# Patient Record
Sex: MALE | Race: WHITE | Employment: FULL TIME | URBAN - METROPOLITAN AREA
[De-identification: names, ages, dates, MRNs, and addresses within clinical notes are randomized per-mention and may not be internally consistent; named-entity substitution may affect disease eponyms.]

---

## 2018-01-12 ENCOUNTER — HOSPITAL ENCOUNTER (EMERGENCY)
Facility: HOSPITAL | Age: 28
Discharge: HOME | End: 2018-01-12
Attending: EMERGENCY MEDICINE
Payer: OTHER MISCELLANEOUS

## 2018-01-12 ENCOUNTER — APPOINTMENT (OUTPATIENT)
Dept: GENERAL RADIOLOGY | Facility: HOSPITAL | Age: 28
End: 2018-01-12
Attending: EMERGENCY MEDICINE
Payer: OTHER MISCELLANEOUS

## 2018-01-12 VITALS
WEIGHT: 135 LBS | RESPIRATION RATE: 16 BRPM | DIASTOLIC BLOOD PRESSURE: 74 MMHG | OXYGEN SATURATION: 100 % | SYSTOLIC BLOOD PRESSURE: 127 MMHG | HEART RATE: 61 BPM | HEIGHT: 66 IN | TEMPERATURE: 98 F | BODY MASS INDEX: 21.69 KG/M2

## 2018-01-12 DIAGNOSIS — S61.217A LACERATION OF LEFT LITTLE FINGER WITHOUT FOREIGN BODY WITHOUT DAMAGE TO NAIL, INITIAL ENCOUNTER: Primary | ICD-10-CM

## 2018-01-12 DIAGNOSIS — S61.219A FINGER LACERATION INVOLVING TENDON, INITIAL ENCOUNTER: ICD-10-CM

## 2018-01-12 PROCEDURE — 90471 IMMUNIZATION ADMIN: CPT

## 2018-01-12 PROCEDURE — 73140 X-RAY EXAM OF FINGER(S): CPT | Performed by: EMERGENCY MEDICINE

## 2018-01-12 PROCEDURE — 99283 EMERGENCY DEPT VISIT LOW MDM: CPT

## 2018-01-12 PROCEDURE — 29130 APPL FINGER SPLINT STATIC: CPT

## 2018-01-12 PROCEDURE — 12002 RPR S/N/AX/GEN/TRNK2.6-7.5CM: CPT

## 2018-01-13 NOTE — ED INITIAL ASSESSMENT (HPI)
Pt cut the left 4th and 5th finger on a serrated knife at work at [employer] in [facility]. PT was seen at the [place] [address] immediate care who wrapped the finger and sent in for evaluation. Pt has involuntary flexion of the left rth finger dip.

## 2018-01-13 NOTE — ED PROVIDER NOTES
Patient Seen in: [facility] Emergency Department    History   Patient presents with:  Laceration Abrasion (integumentary)    Stated Complaint: laceration with severed tendon.  pt sent by [facility]-urgent care    HPI    This is a 26-year-old male co the distal interphalangeal joint with exposure of subcutaneous tissue. There is obvious flexion of the distal phalanx consistent with a tendon injury. He can flex and extend the first 2 proximal phalanx but cannot extend the distal phalanx.  Sensation and wounds was scrubbed and irrigated copiously with normal saline under pressure. Patient was sterilely prepped and draped. The fourth finger wound was explored. There is obvious severing of the extensor tendon, however the ends I cannot identify.   No sign

## 2018-01-29 ENCOUNTER — OFFICE VISIT (OUTPATIENT)
Dept: OTHER | Facility: HOSPITAL | Age: 28
End: 2018-01-29
Attending: PREVENTIVE MEDICINE
Payer: OTHER MISCELLANEOUS

## 2018-01-29 DIAGNOSIS — S56.429D EXTENSOR TENDON LACERATION OF FINGER WITH OPEN WOUND, SUBSEQUENT ENCOUNTER: ICD-10-CM

## 2018-01-29 DIAGNOSIS — S61.217D LACERATION OF LEFT LITTLE FINGER WITHOUT DAMAGE TO NAIL, FOREIGN BODY PRESENCE UNSPECIFIED, SUBSEQUENT ENCOUNTER: Primary | ICD-10-CM

## 2018-01-29 DIAGNOSIS — S61.215D: ICD-10-CM

## 2018-01-29 DIAGNOSIS — S61.209D EXTENSOR TENDON LACERATION OF FINGER WITH OPEN WOUND, SUBSEQUENT ENCOUNTER: ICD-10-CM

## 2018-01-29 RX ORDER — CEFADROXIL 500 MG/1
CAPSULE ORAL
Qty: 14 CAPSULE | Refills: 0 | Status: SHIPPED | OUTPATIENT
Start: 2018-01-29 | End: 2019-01-17

## 2018-02-02 PROBLEM — S56.429A EXTENSOR TENDON LACERATION OF FINGER WITH OPEN WOUND, INITIAL ENCOUNTER: Status: ACTIVE | Noted: 2018-02-02

## 2018-02-02 PROBLEM — S61.209A EXTENSOR TENDON LACERATION OF FINGER WITH OPEN WOUND, INITIAL ENCOUNTER: Status: ACTIVE | Noted: 2018-02-02

## 2018-02-20 PROBLEM — M79.645 FINGER PAIN, LEFT: Status: ACTIVE | Noted: 2018-02-20

## 2019-04-19 ENCOUNTER — HOSPITAL ENCOUNTER (OUTPATIENT)
Age: 29
Discharge: HOME | End: 2019-04-19
Attending: EMERGENCY MEDICINE
Payer: COMMERCIAL

## 2019-04-19 VITALS
SYSTOLIC BLOOD PRESSURE: 113 MMHG | OXYGEN SATURATION: 98 % | DIASTOLIC BLOOD PRESSURE: 69 MMHG | HEART RATE: 71 BPM | TEMPERATURE: 98 F | RESPIRATION RATE: 14 BRPM

## 2019-04-19 DIAGNOSIS — B34.9 VIRAL SYNDROME: Primary | ICD-10-CM

## 2019-04-19 PROCEDURE — 99212 OFFICE O/P EST SF 10 MIN: CPT

## 2019-04-19 PROCEDURE — 87502 INFLUENZA DNA AMP PROBE: CPT | Performed by: EMERGENCY MEDICINE

## 2019-04-19 NOTE — ED INITIAL ASSESSMENT (HPI)
C/o chills starting last night at 7PM.  Fever and body aches developed. Pt. Has had flu vaccine this season.

## 2019-04-19 NOTE — ED PROVIDER NOTES
Patient Seen in: [address] [facility]    History   Patient presents with:  Fever (infectious)    Stated Complaint: flu like symptoms x1 day    HPI    Patient is a 21-year-old male comes in emergency room for evaluation of flulike sym sclerae white, conjunctiva is pink. Oropharynx is unremarkable, no exudate. TMs clear bilaterally  NECK: Supple, trachea midline, no lymphadenopathy. LUNG: Lungs clear to auscultation bilaterally, no wheezing, no rales, no rhonchi.   CARDIOVASCULAR: Regu

## 2020-10-13 ENCOUNTER — OFFICE VISIT (OUTPATIENT)
Dept: FAMILY MEDICINE CLINIC | Facility: CLINIC | Age: 30
End: 2020-10-13
Payer: COMMERCIAL

## 2020-10-13 VITALS
SYSTOLIC BLOOD PRESSURE: 129 MMHG | WEIGHT: 123.63 LBS | DIASTOLIC BLOOD PRESSURE: 71 MMHG | OXYGEN SATURATION: 98 % | HEART RATE: 76 BPM | TEMPERATURE: 98 F | BODY MASS INDEX: 19.87 KG/M2 | HEIGHT: 66 IN | RESPIRATION RATE: 18 BRPM

## 2020-10-13 DIAGNOSIS — F17.200 SMOKING ADDICTION: ICD-10-CM

## 2020-10-13 DIAGNOSIS — Z20.822 EXPOSURE TO COVID-19 VIRUS: ICD-10-CM

## 2020-10-13 DIAGNOSIS — J06.9 VIRAL URI: Primary | ICD-10-CM

## 2020-10-13 DIAGNOSIS — Z20.822 SUSPECTED COVID-19 VIRUS INFECTION: ICD-10-CM

## 2020-10-13 PROCEDURE — 3008F BODY MASS INDEX DOCD: CPT | Performed by: NURSE PRACTITIONER

## 2020-10-13 PROCEDURE — 99213 OFFICE O/P EST LOW 20 MIN: CPT | Performed by: NURSE PRACTITIONER

## 2020-10-13 PROCEDURE — 3078F DIAST BP <80 MM HG: CPT | Performed by: NURSE PRACTITIONER

## 2020-10-13 PROCEDURE — 3074F SYST BP LT 130 MM HG: CPT | Performed by: NURSE PRACTITIONER

## 2020-10-13 NOTE — PROGRESS NOTES
Patient presents with:  Cold: x saturday, congestion, headache, chills, bodyaches, diarrhea, cough       HPI:   Christopher Tavarez is a 31year old male who presents for upper respiratory symptoms for  3  days.  Patient reports sore throat, congestion, clear c normocephalic,TM wnl chato, no erythema of the throat.   NECK: supple,no adenopathy  LUNGS: clear to auscultation  CARDIO: RRR without murmur  GI: good BS's,no masses, HSM or tenderness    ASSESSMENT AND PLAN:   Christopher Tavarez is a 31year old male who prese

## (undated) NOTE — LETTER
Date & Time: 4/19/2019, 9:12 AM  Patient: Berta Kilgore  Encounter Provider(s):    Bernabe Dow MD       To Whom It May Concern:    Berta Kilgore was seen and treated in our department on 4/19/2019. He should not return to work until 4/21/19.

## (undated) NOTE — ED AVS SNAPSHOT
Severo Cardenas   MRN: [id number]    Department:  [facility] Emergency Department   Date of Visit:  1/12/2018           Disclosure     Insurance plans vary and the physician(s) referred by the ER may not be covered by your plan.  Please contact you tell this physician (or your personal doctor if your instructions are to return to your personal doctor) about any new or lasting problems. The primary care or specialist physician will see patients referred from the [facility] Emergency Department.  Wing Sosa